# Patient Record
Sex: FEMALE
[De-identification: names, ages, dates, MRNs, and addresses within clinical notes are randomized per-mention and may not be internally consistent; named-entity substitution may affect disease eponyms.]

---

## 2023-06-15 ENCOUNTER — APPOINTMENT (OUTPATIENT)
Dept: VASCULAR SURGERY | Facility: CLINIC | Age: 78
End: 2023-06-15
Payer: MEDICARE

## 2023-06-15 VITALS
HEART RATE: 67 BPM | DIASTOLIC BLOOD PRESSURE: 63 MMHG | BODY MASS INDEX: 25.49 KG/M2 | WEIGHT: 135 LBS | HEIGHT: 61 IN | SYSTOLIC BLOOD PRESSURE: 101 MMHG

## 2023-06-15 DIAGNOSIS — I87.2 VENOUS INSUFFICIENCY (CHRONIC) (PERIPHERAL): ICD-10-CM

## 2023-06-15 PROCEDURE — 99204 OFFICE O/P NEW MOD 45 MIN: CPT

## 2023-06-15 PROCEDURE — 93970 EXTREMITY STUDY: CPT

## 2023-06-15 NOTE — HISTORY OF PRESENT ILLNESS
[FreeTextEntry1] : Patient is a 77-year-old female with history significant for hypertension and diabetes type 2 currently on metformin who presents to the office for evaluation of bilateral toe discoloration and bilateral spider and varicose veins.  Denies swelling or shortness of breath.\par Denies tissue loss, ulceration or bleeding varicosities.  Denies rest pain or claudication symptoms.\par No history of smoking.

## 2023-06-15 NOTE — ASSESSMENT
[FreeTextEntry1] :  77-year-old female with history significant for hypertension and diabetes type 2 currently on metformin who presents to the office for evaluation of bilateral toe discoloration and bilateral spider and varicose veins.\par \par Patient has no evidence of significant arterial insufficiency.\par \par In the office today, patient underwent venous duplex which demonstrated venous insufficiency of the greater saphenous vein lateral lower extremities.  Patient may be a candidate for ablation of the greater saphenous vein should conservative measures fail.\par \par Recommend compression and elevation.\par Patient to return for follow-up in 3 months.

## 2023-06-15 NOTE — PHYSICAL EXAM
[2+] : left 2+ [Varicose Veins Of Lower Extremities] : present [Ankle Swelling On The Left] : moderate [No Rash or Lesion] : No rash or lesion [Alert] : alert [Calm] : calm [JVD] : no jugular venous distention  [Ankle Swelling (On Exam)] : not present [] : not present

## 2023-12-19 ENCOUNTER — APPOINTMENT (OUTPATIENT)
Dept: VASCULAR SURGERY | Facility: CLINIC | Age: 78
End: 2023-12-19

## 2025-02-18 ENCOUNTER — TELEPHONE (OUTPATIENT)
Age: 80
End: 2025-02-18

## 2025-02-18 NOTE — TELEPHONE ENCOUNTER
Pt called to schedule a new patient appointment. Pt c/o heart burn.    Pt does speak fairly well english but an  was included in the call due to the amount of information needing to be obtained.    Pt scheduled for Tuesday, 2/25/25 at 1 pm with Dr. Ramírez.  Pt does have medicare insurance but did not have cards with her at the time of scheduling.    Pt will bring photo ID and insurance cards to appointment.

## 2025-02-25 ENCOUNTER — OFFICE VISIT (OUTPATIENT)
Dept: FAMILY MEDICINE CLINIC | Facility: CLINIC | Age: 80
End: 2025-02-25

## 2025-02-25 VITALS
DIASTOLIC BLOOD PRESSURE: 62 MMHG | TEMPERATURE: 96.2 F | OXYGEN SATURATION: 97 % | SYSTOLIC BLOOD PRESSURE: 130 MMHG | HEART RATE: 61 BPM | BODY MASS INDEX: 29.45 KG/M2 | WEIGHT: 150 LBS | HEIGHT: 60 IN

## 2025-02-25 DIAGNOSIS — D86.0 SARCOIDOSIS OF LUNG (HCC): ICD-10-CM

## 2025-02-25 DIAGNOSIS — I10 PRIMARY HYPERTENSION: ICD-10-CM

## 2025-02-25 DIAGNOSIS — R73.9 HYPERGLYCEMIA: ICD-10-CM

## 2025-02-25 DIAGNOSIS — E03.9 HYPOTHYROIDISM, UNSPECIFIED TYPE: ICD-10-CM

## 2025-02-25 DIAGNOSIS — Z76.89 ENCOUNTER TO ESTABLISH CARE: Primary | ICD-10-CM

## 2025-02-25 DIAGNOSIS — Z13.820 OSTEOPOROSIS SCREENING: ICD-10-CM

## 2025-02-25 DIAGNOSIS — N95.9 UNSPECIFIED MENOPAUSAL AND PERIMENOPAUSAL DISORDER: ICD-10-CM

## 2025-02-25 DIAGNOSIS — Z11.59 NEED FOR HEPATITIS C SCREENING TEST: ICD-10-CM

## 2025-02-25 PROBLEM — D57.3 SICKLE CELL TRAIT (HCC): Chronic | Status: ACTIVE | Noted: 2018-10-19

## 2025-02-25 PROBLEM — I35.1 AORTIC REGURGITATION: Status: ACTIVE | Noted: 2023-06-16

## 2025-02-25 PROBLEM — D50.9 IRON DEFICIENCY ANEMIA: Status: ACTIVE | Noted: 2018-04-20

## 2025-02-25 PROBLEM — G47.33 OBSTRUCTIVE SLEEP APNEA: Status: ACTIVE | Noted: 2025-02-25

## 2025-02-25 PROBLEM — Z90.710 S/P TAH (TOTAL ABDOMINAL HYSTERECTOMY): Status: ACTIVE | Noted: 2025-02-25

## 2025-02-25 PROBLEM — H40.9 GLAUCOMA: Status: ACTIVE | Noted: 2025-02-25

## 2025-02-25 RX ORDER — LOSARTAN POTASSIUM AND HYDROCHLOROTHIAZIDE 12.5; 1 MG/1; MG/1
1 TABLET ORAL DAILY
COMMUNITY
Start: 2025-01-01 | End: 2025-02-25 | Stop reason: ALTCHOICE

## 2025-02-25 RX ORDER — LOSARTAN POTASSIUM AND HYDROCHLOROTHIAZIDE 12.5; 1 MG/1; MG/1
1 TABLET ORAL DAILY
COMMUNITY

## 2025-02-25 RX ORDER — LEVOTHYROXINE SODIUM 75 UG/1
1 TABLET ORAL DAILY
COMMUNITY
Start: 2024-11-26

## 2025-02-25 RX ORDER — LISINOPRIL 5 MG/1
5 TABLET ORAL DAILY
Qty: 30 TABLET | Refills: 1 | Status: SHIPPED | OUTPATIENT
Start: 2025-02-25 | End: 2025-02-25 | Stop reason: CLARIF

## 2025-02-25 RX ORDER — BRINZOLAMIDE 10 MG/ML
1 SUSPENSION/ DROPS OPHTHALMIC
COMMUNITY
Start: 2025-01-09

## 2025-02-25 RX ORDER — LISINOPRIL 20 MG/1
20 TABLET ORAL DAILY
COMMUNITY
End: 2025-02-25 | Stop reason: ALTCHOICE

## 2025-02-25 NOTE — ASSESSMENT & PLAN NOTE
-Maintained on 75 mcg daily of levothyroxine  Orders:    Lipid panel; Future    TSH, 3rd generation; Future    T4, free; Future

## 2025-02-25 NOTE — ASSESSMENT & PLAN NOTE
-Maintained on losartan-HCTZ 100-12.5 mg daily  -BP stable today  Orders:    Comprehensive metabolic panel; Future    CBC and differential; Future

## 2025-02-25 NOTE — ASSESSMENT & PLAN NOTE
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

## 2025-02-25 NOTE — PROGRESS NOTES
Name: Radha Rios      : 1945      MRN: [id number]  Encounter Provider: Liv Ramírez MD  Encounter Date: 2025   Encounter department: [facility]    Assessment & Plan  Encounter to establish care  -Patient has history of sarcoidosis, HTN, ataxia post stroke back in   -Patient has a lot of history, we will await records  -Will obtain baseline labs for now  -Patient may benefit from statin therapy given history of stroke  -Follow-up in 2 weeks for annual physical       Osteoporosis screening    Orders:    DXA bone density spine hip and pelvis; Future    Primary hypertension  -Maintained on losartan-HCTZ 100-12.5 mg daily  -BP stable today  Orders:    Comprehensive metabolic panel; Future    CBC and differential; Future    Hypothyroidism, unspecified type  -Maintained on 75 mcg daily of levothyroxine  Orders:    Lipid panel; Future    TSH, 3rd generation; Future    T4, free; Future    Unspecified menopausal and perimenopausal disorder    Orders:    DXA bone density spine hip and pelvis; Future    Hyperglycemia  -Patient has no history of type 2 diabetes  -Latest A1c noted to be 6.8, unsure if patient was on steroid  -We will obtain HbA1c  Orders:    Hemoglobin A1C; Future    Sarcoidosis of lung (HCC)  -Reports history of sarcoidosis of lung several years ago  -Reports that she has never followed pulmonologist   -She states that she received steroid treatment several years ago by her PCP  -We will refer to pulmonology  Orders:    Ambulatory Referral to Pulmonology; Future    Need for hepatitis C screening test    Orders:    Hepatitis C antibody; Future         History of Present Illness     Patient is a 79-year-old female who presents to the office today to establish care.  Patient states that she was following Dr. Neil who was her primary care until now.  She states that this office is near and therefore would like to establish care.  Patient denies any acute concerns  today.  She states that she only takes 2 medications 1 for hypothyroidism and 1 for blood pressure.  Patient states that she had a history of stroke in the past, unsure the year.  She states that she has history of sarcoidosis although has never seen a pulmonologist.  Patient states that she does not follow any other specialists.  Patient states that she had cholecystectomy, hysterectomy in the past.  She states that she lives alone and her son lives in [place].  She states that she is independent with her ADLs and IADLs.  No other concerns today.      Review of Systems   Constitutional:  Negative for chills and fever.   HENT:  Negative for ear pain and sore throat.    Eyes:  Negative for pain and visual disturbance.   Respiratory:  Negative for cough and shortness of breath.    Cardiovascular:  Negative for chest pain and palpitations.   Gastrointestinal:  Negative for abdominal pain and vomiting.   Genitourinary:  Negative for dysuria and hematuria.   Musculoskeletal:  Negative for arthralgias and back pain.   Skin:  Negative for color change and rash.   Neurological:  Negative for seizures and syncope.   All other systems reviewed and are negative.    Objective     /62   Pulse 61   Temp (!) 96.2 °F (35.7 °C)   Ht 5' (1.524 m)   Wt 68 kg (150 lb)   SpO2 97%   BMI 29.29 kg/m²     Physical Exam  Vitals and nursing note reviewed.   Constitutional:       General: She is not in acute distress.     Appearance: She is well-developed.   HENT:      Head: Normocephalic and atraumatic.   Eyes:      Conjunctiva/sclera: Conjunctivae normal.   Cardiovascular:      Rate and Rhythm: Normal rate and regular rhythm.      Heart sounds: No murmur heard.  Pulmonary:      Effort: Pulmonary effort is normal. No respiratory distress.      Breath sounds: Normal breath sounds.   Abdominal:      Palpations: Abdomen is soft.      Tenderness: There is no abdominal tenderness.   Musculoskeletal:         General: No swelling.       Cervical back: Neck supple.   Skin:     General: Skin is warm and dry.      Capillary Refill: Capillary refill takes less than 2 seconds.   Neurological:      Mental Status: She is alert.   Psychiatric:         Mood and Affect: Mood normal.         Liv Ramírez MD

## 2025-02-26 ENCOUNTER — APPOINTMENT (OUTPATIENT)
Dept: LAB | Facility: CLINIC | Age: 80
End: 2025-02-26
Payer: MEDICARE

## 2025-02-26 DIAGNOSIS — E03.9 HYPOTHYROIDISM, UNSPECIFIED TYPE: ICD-10-CM

## 2025-02-26 DIAGNOSIS — Z11.59 NEED FOR HEPATITIS C SCREENING TEST: ICD-10-CM

## 2025-02-26 DIAGNOSIS — R73.9 HYPERGLYCEMIA: ICD-10-CM

## 2025-02-26 DIAGNOSIS — I10 PRIMARY HYPERTENSION: ICD-10-CM

## 2025-02-26 LAB
ALBUMIN SERPL BCG-MCNC: 4 G/DL (ref 3.5–5)
ALP SERPL-CCNC: 95 U/L (ref 34–104)
ALT SERPL W P-5'-P-CCNC: 51 U/L (ref 7–52)
ANION GAP SERPL CALCULATED.3IONS-SCNC: 10 MMOL/L (ref 4–13)
AST SERPL W P-5'-P-CCNC: 36 U/L (ref 13–39)
BASOPHILS # BLD AUTO: 0.06 THOUSANDS/ÂΜL (ref 0–0.1)
BASOPHILS NFR BLD AUTO: 1 % (ref 0–1)
BILIRUB SERPL-MCNC: 0.57 MG/DL (ref 0.2–1)
BUN SERPL-MCNC: 17 MG/DL (ref 5–25)
CALCIUM SERPL-MCNC: 9.5 MG/DL (ref 8.4–10.2)
CHLORIDE SERPL-SCNC: 107 MMOL/L (ref 96–108)
CHOLEST SERPL-MCNC: 178 MG/DL (ref ?–200)
CO2 SERPL-SCNC: 27 MMOL/L (ref 21–32)
CREAT SERPL-MCNC: 0.95 MG/DL (ref 0.6–1.3)
EOSINOPHIL # BLD AUTO: 0.14 THOUSAND/ÂΜL (ref 0–0.61)
EOSINOPHIL NFR BLD AUTO: 2 % (ref 0–6)
ERYTHROCYTE [DISTWIDTH] IN BLOOD BY AUTOMATED COUNT: 14.1 % (ref 11.6–15.1)
GFR SERPL CREATININE-BSD FRML MDRD: 57 ML/MIN/1.73SQ M
GLUCOSE P FAST SERPL-MCNC: 111 MG/DL (ref 65–99)
HCT VFR BLD AUTO: 45.5 % (ref 34.8–46.1)
HCV AB SER QL: NORMAL
HDLC SERPL-MCNC: 60 MG/DL
HGB BLD-MCNC: 14.5 G/DL (ref 11.5–15.4)
IMM GRANULOCYTES # BLD AUTO: 0.05 THOUSAND/UL (ref 0–0.2)
IMM GRANULOCYTES NFR BLD AUTO: 1 % (ref 0–2)
LDLC SERPL CALC-MCNC: 99 MG/DL (ref 0–100)
LYMPHOCYTES # BLD AUTO: 3.07 THOUSANDS/ÂΜL (ref 0.6–4.47)
LYMPHOCYTES NFR BLD AUTO: 48 % (ref 14–44)
MCH RBC QN AUTO: 29.1 PG (ref 26.8–34.3)
MCHC RBC AUTO-ENTMCNC: 31.9 G/DL (ref 31.4–37.4)
MCV RBC AUTO: 91 FL (ref 82–98)
MONOCYTES # BLD AUTO: 0.55 THOUSAND/ÂΜL (ref 0.17–1.22)
MONOCYTES NFR BLD AUTO: 9 % (ref 4–12)
NEUTROPHILS # BLD AUTO: 2.44 THOUSANDS/ÂΜL (ref 1.85–7.62)
NEUTS SEG NFR BLD AUTO: 39 % (ref 43–75)
NONHDLC SERPL-MCNC: 118 MG/DL
NRBC BLD AUTO-RTO: 0 /100 WBCS
PLATELET # BLD AUTO: 209 THOUSANDS/UL (ref 149–390)
PMV BLD AUTO: 10.7 FL (ref 8.9–12.7)
POTASSIUM SERPL-SCNC: 3.6 MMOL/L (ref 3.5–5.3)
PROT SERPL-MCNC: 7.2 G/DL (ref 6.4–8.4)
RBC # BLD AUTO: 4.98 MILLION/UL (ref 3.81–5.12)
SODIUM SERPL-SCNC: 144 MMOL/L (ref 135–147)
T4 FREE SERPL-MCNC: 0.9 NG/DL (ref 0.61–1.12)
TRIGL SERPL-MCNC: 96 MG/DL (ref ?–150)
TSH SERPL DL<=0.05 MIU/L-ACNC: 2.39 UIU/ML (ref 0.45–4.5)
WBC # BLD AUTO: 6.31 THOUSAND/UL (ref 4.31–10.16)

## 2025-02-26 PROCEDURE — 80053 COMPREHEN METABOLIC PANEL: CPT

## 2025-02-26 PROCEDURE — 83036 HEMOGLOBIN GLYCOSYLATED A1C: CPT

## 2025-02-26 PROCEDURE — 36415 COLL VENOUS BLD VENIPUNCTURE: CPT

## 2025-02-26 PROCEDURE — 85025 COMPLETE CBC W/AUTO DIFF WBC: CPT

## 2025-02-26 PROCEDURE — 86803 HEPATITIS C AB TEST: CPT

## 2025-02-26 PROCEDURE — 80061 LIPID PANEL: CPT

## 2025-02-26 PROCEDURE — 84443 ASSAY THYROID STIM HORMONE: CPT

## 2025-02-26 PROCEDURE — 84439 ASSAY OF FREE THYROXINE: CPT

## 2025-02-27 ENCOUNTER — RESULTS FOLLOW-UP (OUTPATIENT)
Dept: FAMILY MEDICINE CLINIC | Facility: CLINIC | Age: 80
End: 2025-02-27

## 2025-02-27 PROBLEM — E11.9 TYPE 2 DIABETES MELLITUS WITHOUT COMPLICATION, WITHOUT LONG-TERM CURRENT USE OF INSULIN (HCC): Status: ACTIVE | Noted: 2025-02-27

## 2025-02-27 PROBLEM — N18.31 STAGE 3A CHRONIC KIDNEY DISEASE (HCC): Status: ACTIVE | Noted: 2025-02-27

## 2025-02-27 LAB
EST. AVERAGE GLUCOSE BLD GHB EST-MCNC: 148 MG/DL
HBA1C MFR BLD: 6.8 %

## 2025-02-27 NOTE — TELEPHONE ENCOUNTER
Patient informed of test results. Patient wanted to be seen sooner than March 11th due to leaving the country soon. Rescheduled to sooner appointment with Andrew.     ----- Message from Liv Ramírez MD sent at 2/27/2025 11:42 AM EST -----  [facility],    Please inform patient that I have reviewed labs. Patient's electrolytes, liver function, thyroid function, hemoglobin levels, cholesterol levels are all within normal limits. Hepatitis C is negative. HbA1c which is a 3 month average of blood sugars shows Type 2 diabetes. We will discuss management  and other labs in detail in the upcoming appointment.    Thanks,  Dr. Ramírez

## 2025-03-03 ENCOUNTER — RA CDI HCC (OUTPATIENT)
Dept: OTHER | Facility: HOSPITAL | Age: 80
End: 2025-03-03

## 2025-03-07 ENCOUNTER — OFFICE VISIT (OUTPATIENT)
Dept: FAMILY MEDICINE CLINIC | Facility: CLINIC | Age: 80
End: 2025-03-07
Payer: MEDICARE

## 2025-03-07 ENCOUNTER — TELEPHONE (OUTPATIENT)
Age: 80
End: 2025-03-07

## 2025-03-07 VITALS
TEMPERATURE: 98.4 F | OXYGEN SATURATION: 98 % | DIASTOLIC BLOOD PRESSURE: 60 MMHG | SYSTOLIC BLOOD PRESSURE: 128 MMHG | WEIGHT: 149.2 LBS | BODY MASS INDEX: 29.29 KG/M2 | HEIGHT: 60 IN | HEART RATE: 85 BPM

## 2025-03-07 DIAGNOSIS — K21.9 GASTROESOPHAGEAL REFLUX DISEASE, UNSPECIFIED WHETHER ESOPHAGITIS PRESENT: Primary | ICD-10-CM

## 2025-03-07 DIAGNOSIS — N18.31 STAGE 3A CHRONIC KIDNEY DISEASE (HCC): ICD-10-CM

## 2025-03-07 DIAGNOSIS — G89.29 CHRONIC RIGHT HIP PAIN: ICD-10-CM

## 2025-03-07 DIAGNOSIS — E11.9 TYPE 2 DIABETES MELLITUS WITHOUT COMPLICATION, WITHOUT LONG-TERM CURRENT USE OF INSULIN (HCC): ICD-10-CM

## 2025-03-07 DIAGNOSIS — M25.551 CHRONIC RIGHT HIP PAIN: ICD-10-CM

## 2025-03-07 PROBLEM — R13.19 ESOPHAGEAL DYSPHAGIA: Status: RESOLVED | Noted: 2021-10-28 | Resolved: 2025-03-07

## 2025-03-07 PROBLEM — D64.9 SYMPTOMATIC ANEMIA: Status: RESOLVED | Noted: 2018-04-03 | Resolved: 2025-03-07

## 2025-03-07 PROBLEM — K44.9 PARAESOPHAGEAL HERNIA: Status: RESOLVED | Noted: 2022-08-11 | Resolved: 2025-03-07

## 2025-03-07 PROBLEM — Z87.19 HX OF DIVERTICULITIS OF COLON: Status: RESOLVED | Noted: 2025-03-07 | Resolved: 2025-03-07

## 2025-03-07 LAB
CREAT UR-MCNC: 24.9 MG/DL
MICROALBUMIN UR-MCNC: 74.1 MG/L
MICROALBUMIN/CREAT 24H UR: 298 MG/G CREATININE (ref 0–30)

## 2025-03-07 PROCEDURE — G0438 PPPS, INITIAL VISIT: HCPCS | Performed by: PHYSICIAN ASSISTANT

## 2025-03-07 PROCEDURE — 82570 ASSAY OF URINE CREATININE: CPT | Performed by: PHYSICIAN ASSISTANT

## 2025-03-07 PROCEDURE — 82043 UR ALBUMIN QUANTITATIVE: CPT | Performed by: PHYSICIAN ASSISTANT

## 2025-03-07 PROCEDURE — 99214 OFFICE O/P EST MOD 30 MIN: CPT | Performed by: PHYSICIAN ASSISTANT

## 2025-03-07 PROCEDURE — G2211 COMPLEX E/M VISIT ADD ON: HCPCS | Performed by: PHYSICIAN ASSISTANT

## 2025-03-07 RX ORDER — FAMOTIDINE 20 MG/1
20 TABLET, FILM COATED ORAL 2 TIMES DAILY
Qty: 120 TABLET | Refills: 0 | Status: SHIPPED | OUTPATIENT
Start: 2025-03-07

## 2025-03-07 NOTE — ASSESSMENT & PLAN NOTE
- med refill   Orders:    famotidine (PEPCID) 20 mg tablet; Take 1 tablet (20 mg total) by mouth 2 (two) times a day

## 2025-03-07 NOTE — ASSESSMENT & PLAN NOTE
Lab Results   Component Value Date    EGFR 57 02/26/2025    EGFR 68 10/23/2024    EGFR 76 03/25/2024    CREATININE 0.95 02/26/2025    CREATININE 0.9 10/23/2024    CREATININE 0.8 03/25/2024      - EGFR stable

## 2025-03-07 NOTE — PATIENT INSTRUCTIONS
Medicare Preventive Visit Patient Instructions  Thank you for completing your Welcome to Medicare Visit or Medicare Annual Wellness Visit today. Your next wellness visit will be due in one year (3/8/2026).  The screening/preventive services that you may require over the next 5-10 years are detailed below. Some tests may not apply to you based off risk factors and/or age. Screening tests ordered at today's visit but not completed yet may show as past due. Also, please note that scanned in results may not display below.  Preventive Screenings:  Service Recommendations Previous Testing/Comments   Colorectal Cancer Screening  * Colonoscopy    * Fecal Occult Blood Test (FOBT)/Fecal Immunochemical Test (FIT)  * Fecal DNA/Cologuard Test  * Flexible Sigmoidoscopy Age: 45-75 years old   Colonoscopy: every 10 years (may be performed more frequently if at higher risk)  OR  FOBT/FIT: every 1 year  OR  Cologuard: every 3 years  OR  Sigmoidoscopy: every 5 years  Screening may be recommended earlier than age 45 if at higher risk for colorectal cancer. Also, an individualized decision between you and your healthcare provider will decide whether screening between the ages of 76-85 would be appropriate. Colonoscopy: Not on file  FOBT/FIT: Not on file  Cologuard: Not on file  Sigmoidoscopy: Not on file    Screening Not Indicated     Breast Cancer Screening Age: 40+ years old  Frequency: every 1-2 years  Not required if history of left and right mastectomy Mammogram: 06/13/2024    Screening Current   Cervical Cancer Screening Between the ages of 21-29, pap smear recommended once every 3 years.   Between the ages of 30-65, can perform pap smear with HPV co-testing every 5 years.   Recommendations may differ for women with a history of total hysterectomy, cervical cancer, or abnormal pap smears in past. Pap Smear: Not on file    Screening Not Indicated   Hepatitis C Screening Once for adults born between 1945 and 1965  More frequently in  patients at high risk for Hepatitis C Hep C Antibody: 02/26/2025    Screening Current   Diabetes Screening 1-2 times per year if you're at risk for diabetes or have pre-diabetes Fasting glucose: 111 mg/dL (2/26/2025)  A1C: 6.8 % (2/26/2025)  Screening Not Indicated  History Diabetes   Cholesterol Screening Once every 5 years if you don't have a lipid disorder. May order more often based on risk factors. Lipid panel: 02/26/2025    Screening Current     Other Preventive Screenings Covered by Medicare:  Abdominal Aortic Aneurysm (AAA) Screening: covered once if your at risk. You're considered to be at risk if you have a family history of AAA.  Lung Cancer Screening: covers low dose CT scan once per year if you meet all of the following conditions: (1) Age 55-77; (2) No signs or symptoms of lung cancer; (3) Current smoker or have quit smoking within the last 15 years; (4) You have a tobacco smoking history of at least 20 pack years (packs per day multiplied by number of years you smoked); (5) You get a written order from a healthcare provider.  Glaucoma Screening: covered annually if you're considered high risk: (1) You have diabetes OR (2) Family history of glaucoma OR (3)  aged 50 and older OR (4)  American aged 65 and older  Osteoporosis Screening: covered every 2 years if you meet one of the following conditions: (1) You're estrogen deficient and at risk for osteoporosis based off medical history and other findings; (2) Have a vertebral abnormality; (3) On glucocorticoid therapy for more than 3 months; (4) Have primary hyperparathyroidism; (5) On osteoporosis medications and need to assess response to drug therapy.   Last bone density test (DXA Scan): Not on file.  HIV Screening: covered annually if you're between the age of 15-65. Also covered annually if you are younger than 15 and older than 65 with risk factors for HIV infection. For pregnant patients, it is covered up to 3 times per  pregnancy.    Immunizations:  Immunization Recommendations   Influenza Vaccine Annual influenza vaccination during flu season is recommended for all persons aged >= 6 months who do not have contraindications   Pneumococcal Vaccine   * Pneumococcal conjugate vaccine = PCV13 (Prevnar 13), PCV15 (Vaxneuvance), PCV20 (Prevnar 20)  * Pneumococcal polysaccharide vaccine = PPSV23 (Pneumovax) Adults 19-63 yo with certain risk factors or if 65+ yo  If never received any pneumonia vaccine: recommend Prevnar 20 (PCV20)  Give PCV20 if previously received 1 dose of PCV13 or PPSV23   Hepatitis B Vaccine 3 dose series if at intermediate or high risk (ex: diabetes, end stage renal disease, liver disease)   Respiratory syncytial virus (RSV) Vaccine - COVERED BY MEDICARE PART D  * RSVPreF3 (Arexvy) CDC recommends that adults 60 years of age and older may receive a single dose of RSV vaccine using shared clinical decision-making (SCDM)   Tetanus (Td) Vaccine - COST NOT COVERED BY MEDICARE PART B Following completion of primary series, a booster dose should be given every 10 years to maintain immunity against tetanus. Td may also be given as tetanus wound prophylaxis.   Tdap Vaccine - COST NOT COVERED BY MEDICARE PART B Recommended at least once for all adults. For pregnant patients, recommended with each pregnancy.   Shingles Vaccine (Shingrix) - COST NOT COVERED BY MEDICARE PART B  2 shot series recommended in those 19 years and older who have or will have weakened immune systems or those 50 years and older     Health Maintenance Due:      Topic Date Due   • Hepatitis C Screening  Completed     Immunizations Due:      Topic Date Due   • Pneumococcal Vaccine: 65+ Years (1 of 2 - PCV) Never done   • Influenza Vaccine (1) Never done   • COVID-19 Vaccine (3 - 2024-25 season) 09/01/2024     Advance Directives   What are advance directives?  Advance directives are legal documents that state your wishes and plans for medical care. These  plans are made ahead of time in case you lose your ability to make decisions for yourself. Advance directives can apply to any medical decision, such as the treatments you want, and if you want to donate organs.   What are the types of advance directives?  There are many types of advance directives, and each state has rules about how to use them. You may choose a combination of any of the following:  Living will:  This is a written record of the treatment you want. You can also choose which treatments you do not want, which to limit, and which to stop at a certain time. This includes surgery, medicine, IV fluid, and tube feedings.   Durable power of  for healthcare (DPAHC):  This is a written record that states who you want to make healthcare choices for you when you are unable to make them for yourself. This person, called a proxy, is usually a family member or a friend. You may choose more than 1 proxy.  Do not resuscitate (DNR) order:  A DNR order is used in case your heart stops beating or you stop breathing. It is a request not to have certain forms of treatment, such as CPR. A DNR order may be included in other types of advance directives.  Medical directive:  This covers the care that you want if you are in a coma, near death, or unable to make decisions for yourself. You can list the treatments you want for each condition. Treatment may include pain medicine, surgery, blood transfusions, dialysis, IV or tube feedings, and a ventilator (breathing machine).  Values history:  This document has questions about your views, beliefs, and how you feel and think about life. This information can help others choose the care that you would choose.  Why are advance directives important?  An advance directive helps you control your care. Although spoken wishes may be used, it is better to have your wishes written down. Spoken wishes can be misunderstood, or not followed. Treatments may be given even if you do not  want them. An advance directive may make it easier for your family to make difficult choices about your care.   Urinary Incontinence   Urinary incontinence (UI)  is when you lose control of your bladder. UI develops because your bladder cannot store or empty urine properly. The 3 most common types of UI are stress incontinence, urge incontinence, or both.  Medicines:   May be given to help strengthen your bladder control. Report any side effects of medication to your healthcare provider.  Do pelvic muscle exercises often:  Your pelvic muscles help you stop urinating. Squeeze these muscles tight for 5 seconds, then relax for 5 seconds. Gradually work up to squeezing for 10 seconds. Do 3 sets of 15 repetitions a day, or as directed. This will help strengthen your pelvic muscles and improve bladder control.  Train your bladder:  Go to the bathroom at set times, such as every 2 hours, even if you do not feel the urge to go. You can also try to hold your urine when you feel the urge to go. For example, hold your urine for 5 minutes when you feel the urge to go. As that becomes easier, hold your urine for 10 minutes.   Self-care:   Keep a UI record.  Write down how often you leak urine and how much you leak. Make a note of what you were doing when you leaked urine.  Drink liquids as directed. You may need to limit the amount of liquid you drink to help control your urine leakage. Do not drink any liquid right before you go to bed. Limit or do not have drinks that contain caffeine or alcohol.   Prevent constipation.  Eat a variety of high-fiber foods. Good examples are high-fiber cereals, beans, vegetables, and whole-grain breads. Walking is the best way to trigger your intestines to have a bowel movement.  Exercise regularly and maintain a healthy weight.  Weight loss and exercise will decrease pressure on your bladder and help you control your leakage.   Use a catheter as directed  to help empty your bladder. A catheter  is a tiny, plastic tube that is put into your bladder to drain your urine.   Go to behavior therapy as directed.  Behavior therapy may be used to help you learn to control your urge to urinate.    Weight Management   Why it is important to manage your weight:  Being overweight increases your risk of health conditions such as heart disease, high blood pressure, type 2 diabetes, and certain types of cancer. It can also increase your risk for osteoarthritis, sleep apnea, and other respiratory problems. Aim for a slow, steady weight loss. Even a small amount of weight loss can lower your risk of health problems.  How to lose weight safely:  A safe and healthy way to lose weight is to eat fewer calories and get regular exercise. You can lose up about 1 pound a week by decreasing the number of calories you eat by 500 calories each day.   Healthy meal plan for weight management:  A healthy meal plan includes a variety of foods, contains fewer calories, and helps you stay healthy. A healthy meal plan includes the following:  Eat whole-grain foods more often.  A healthy meal plan should contain fiber. Fiber is the part of grains, fruits, and vegetables that is not broken down by your body. Whole-grain foods are healthy and provide extra fiber in your diet. Some examples of whole-grain foods are whole-wheat breads and pastas, oatmeal, brown rice, and bulgur.  Eat a variety of vegetables every day.  Include dark, leafy greens such as spinach, kale, rajni greens, and mustard greens. Eat yellow and orange vegetables such as carrots, sweet potatoes, and winter squash.   Eat a variety of fruits every day.  Choose fresh or canned fruit (canned in its own juice or light syrup) instead of juice. Fruit juice has very little or no fiber.  Eat low-fat dairy foods.  Drink fat-free (skim) milk or 1% milk. Eat fat-free yogurt and low-fat cottage cheese. Try low-fat cheeses such as mozzarella and other reduced-fat cheeses.  Choose meat  and other protein foods that are low in fat.  Choose beans or other legumes such as split peas or lentils. Choose fish, skinless poultry (chicken or turkey), or lean cuts of red meat (beef or pork). Before you cook meat or poultry, cut off any visible fat.   Use less fat and oil.  Try baking foods instead of frying them. Add less fat, such as margarine, sour cream, regular salad dressing and mayonnaise to foods. Eat fewer high-fat foods. Some examples of high-fat foods include french fries, doughnuts, ice cream, and cakes.  Eat fewer sweets.  Limit foods and drinks that are high in sugar. This includes candy, cookies, regular soda, and sweetened drinks.  Exercise:  Exercise at least 30 minutes per day on most days of the week. Some examples of exercise include walking, biking, dancing, and swimming. You can also fit in more physical activity by taking the stairs instead of the elevator or parking farther away from stores. Ask your healthcare provider about the best exercise plan for you.      © Copyright [employer] 2018 Information is for End User's use only and may not be sold, redistributed or otherwise used for commercial purposes. All illustrations and images included in CareNotes® are the copyrighted property of [employer] or [employer]

## 2025-03-07 NOTE — ASSESSMENT & PLAN NOTE
Lab Results   Component Value Date    HGBA1C 6.8 (H) 02/26/2025      - Hx of T2DM, not currently on medication, manages with exercise and diet    - A1c 6.8 as of 2/2025, at goal   - DM foot exam WNL   - Will not opt for medication at this time, continue to manage w/ lifestyle and will monitor every 6 months     Orders:    Ambulatory Referral to Ophthalmology; Future    Albumin / creatinine urine ratio; Future

## 2025-03-07 NOTE — TELEPHONE ENCOUNTER
Son requests recent lab results and PCP recommendation. He asks for clarification about chronic renal disease, diabetes, and cholesterol.

## 2025-03-10 NOTE — TELEPHONE ENCOUNTER
Patient son calling in for the 3rd time requesting a call back regarding questions. Advised Dr. Leal is not in the office today. We will be getting back to her.    Pt son Phone # [phone].       Thank you

## 2025-03-11 ENCOUNTER — TELEPHONE (OUTPATIENT)
Dept: FAMILY MEDICINE CLINIC | Facility: CLINIC | Age: 80
End: 2025-03-11

## 2025-03-11 NOTE — TELEPHONE ENCOUNTER
"Using  # [id number] called patient to really the following message also sent via My Chart from the Provider.    Left message to contact the office. Please relay message.         Good Afternoon,     I tried to call to discuss lab reports. However, please note that a message was sent to patient on 2/27/25 specifically noting that all lab work was going to be discussed at next visit, which was today and already canceled. While I can understand your concern, please rest assured that anything urgent emergent would be discussed ASAP with patient/family. While labs are visible in the chart and appear red/\"abnormal\" this may not necessarily be an urgent/emergent finding.     Thanks,    " 17-Feb-2018

## 2025-03-11 NOTE — TELEPHONE ENCOUNTER
Hi Ladies,     I actually saw this patient on 3/7/2025 when I had the opportunity to review labs with patient, son was not present for visit. As patient is a previously diagnosed diabetic, A1c of 6.8 indicates good control of condition currently so I did not prescribe any diabetes medication at this time but it is something we will continue to monitor. Based on her GFR, patient does technically have chronic kidney disease but her GFR is stable when comparing to previous GFR's collected for this patient. This, combined with good control of both high blood pressure and diabetes, indicates that we are doing everything that we can to delay further progression of chronic kidney disease at this time. Lastly, upon review of her lipid panel, cholesterol numbers look great and we have no concerns from that standpoint.     If there are any other questions or concerns, please let me know !     Kash

## 2025-03-11 NOTE — TELEPHONE ENCOUNTER
PCP attempted to call patient son to discuss labs. Please note that a message was sent on 2/27/25 noting that all lab work was going to be reviewed with patient at today's visit. Unfortunately, appointment was canceled and lab work is something that needed to be discussed.

## 2025-03-11 NOTE — TELEPHONE ENCOUNTER
Called patient's son to relay message below from Kash. Left a general voicemail to call our office at [phone].

## 2025-03-12 ENCOUNTER — TELEPHONE (OUTPATIENT)
Dept: FAMILY MEDICINE CLINIC | Facility: CLINIC | Age: 80
End: 2025-03-12

## 2025-03-12 NOTE — TELEPHONE ENCOUNTER
Spoke to patient's son. Advised patient have an appt to go over everything.     Pt son is in [place] and he is not able to attend the appt. He would like a conference call at he time of the appt so he can hear what is being discussed.    Made a note in the appt notes to call him during the appt time. He will call his Mom to confirm the appt time is good for her as well. If it is not they will call to reschedule.    Note: I noticed the appt on 3-11-25 was rescheduled on 2-27-25 when the 3-7-25 appt was made. It was for an AWV not a follow up appt. There was no follow up made after labs. The next appt was made for May.

## 2025-03-12 NOTE — TELEPHONE ENCOUNTER
Son called to follow up about results. Informed him of results and recommendations, per provider notes.     Son states patient did not know she has kidney disease.     He has many questions about her lab results, her diagnoses, and the plan of care.     Son requests to speak directly by phone with a provider. He asks that someone give him advance notice of when the provider will call, and he will make sure to be available to receive the call.

## 2025-03-13 NOTE — ASSESSMENT & PLAN NOTE
Lab Results   Component Value Date    EGFR 57 02/26/2025    EGFR 68 10/23/2024    EGFR 76 03/25/2024    CREATININE 0.95 02/26/2025    CREATININE 0.9 10/23/2024    CREATININE 0.8 03/25/2024      - EGFR stable, BP and T2DM well controlled, will continue to monitor

## 2025-03-13 NOTE — PROGRESS NOTES
Name: Radha Rios      : 1945      MRN: [id number]  Encounter Provider: Kash Morales PA-C  Encounter Date: 3/7/2025   Encounter department: [facility] PRIMARY CARE    Assessment & Plan  Type 2 diabetes mellitus without complication, without long-term current use of insulin (HCC)       - Hx of T2DM, not currently on medication, manages with exercise and diet               - A1c 6.8 as of 2025, at goal              - DM foot exam WNL              - Will not opt for medication at this time, continue to manage w/ lifestyle and will monitor every 6 months   Lab Results   Component Value Date    HGBA1C 6.8 (H) 2025       Orders:    Ambulatory Referral to Ophthalmology; Future    Albumin / creatinine urine ratio; Future    Stage 3a chronic kidney disease (HCC)  Lab Results   Component Value Date    EGFR 57 2025    EGFR 68 10/23/2024    EGFR 76 2024    CREATININE 0.95 2025    CREATININE 0.9 10/23/2024    CREATININE 0.8 2024      - EGFR stable, BP and T2DM well controlled, will continue to monitor         Gastroesophageal reflux disease, unspecified whether esophagitis present     -refill  Orders:    famotidine (PEPCID) 20 mg tablet; Take 1 tablet (20 mg total) by mouth 2 (two) times a day    Chronic right hip pain     - Pt reported to PCP longstanding hx of R hip pain that comes and goes, worse with activity. NO hx of falls or trauma               - Exam significant for slightly decreased ROM, otherwise WNL                - Will collect baseline hip x ray and refer to PT and recheck in 2 months  Orders:    XR hip/pelv 2-3 vws right if performed; Future    Ambulatory Referral to Physical Therapy; Future      Depression Screening and Follow-up Plan: Patient was screened for depression during today's encounter. They screened negative with a PHQ-2 score of 1.      Urinary Incontinence Plan of Care: counseling topics discussed: issac Dawkins (pelvic floor strengthening)  exercises, use restroom every 2 hours and taking fluid pills at a time when you can get to bathroom easily.     ASCVD Risk Management:  The 10-year ASCVD risk score (Tong DK, et al., 2019) is: 43.9%    Patent does not have underlying ASCVD. Their ASCVD Risk is high (>= 20%).    Preventive services discussed: diabetes screening, diabetes self management training and diet & exercise.  Blood pressure management: well controlled.  Cholesterol management: well controlled.  Tobacco cessation: not smoker.    Preventive health issues were discussed with patient, and age appropriate screening tests were ordered as noted in patient's After Visit Summary. Personalized health advice and appropriate referrals for health education or preventive services given if needed, as noted in patient's After Visit Summary.    History of Present Illness     Radha Rios is a 79 y.o. female presenting for AWV.        Patient Care Team:  Damaris Valenzuela MD as PCP - General (Family Medicine)    Review of Systems   Constitutional:  Negative for fatigue and fever.   Respiratory:  Negative for cough and shortness of breath.    Cardiovascular:  Negative for chest pain.   Gastrointestinal:  Negative for diarrhea, nausea and vomiting.   Neurological:  Negative for headaches.     Medical History Reviewed by provider this encounter:  Tobacco  Allergies  Meds  Problems  Med Hx  Surg Hx  Fam Hx       Annual Wellness Visit Questionnaire   Radha is here for her Subsequent Wellness visit. Last Medicare Wellness visit information reviewed, patient interviewed and updates made to the record today.      Health Risk Assessment:   Patient rates overall health as fair. Patient feels that their physical health rating is slightly better. Patient is dissatisfied with their life. Eyesight was rated as slightly worse. Hearing was rated as slightly worse. Patient feels that their emotional and mental health rating is slightly worse. Patients states they  are sometimes angry. Patient states they are often unusually tired/fatigued. Pain experienced in the last 7 days has been none. Patient states that she has experienced no weight loss or gain in last 6 months.     Depression Screening:   PHQ-2 Score: 1      Fall Risk Screening:   In the past year, patient has experienced: no history of falling in past year      Urinary Incontinence Screening:   Patient has leaked urine accidently in the last six months.     Home Safety:  Patient has trouble with stairs inside or outside of their home. Patient has working smoke alarms and has working carbon monoxide detector. Home safety hazards include: none.     Nutrition:   Current diet is Regular.     Medications:   Patient is not currently taking any over-the-counter supplements. Patient is able to manage medications.     Activities of Daily Living (ADLs)/Instrumental Activities of Daily Living (IADLs):   Walk and transfer into and out of bed and chair?: Yes  Dress and groom yourself?: Yes    Bathe or shower yourself?: Yes    Feed yourself? Yes  Do your laundry/housekeeping?: No  Manage your money, pay your bills and track your expenses?: Yes  Make your own meals?: Yes    Do your own shopping?: Yes    Durable Medical Equipment Suppliers  N/A    Previous Hospitalizations:   Any hospitalizations or ED visits within the last 12 months?: No      Advance Care Planning:   Living will: No    Durable POA for healthcare: No    Advanced directive: No    Advanced directive counseling given: Yes    ACP document given: Yes      Cognitive Screening:   Provider or family/friend/caregiver concerned regarding cognition?: No    PREVENTIVE SCREENINGS      Cardiovascular Screening:    General: Screening Current      Diabetes Screening:     General: Screening Not Indicated and History Diabetes      Colorectal Cancer Screening:     General: Screening Not Indicated      Breast Cancer Screening:     General: Screening Current      Cervical Cancer  Screening:    General: Screening Not Indicated      Osteoporosis Screening:    General: Screening Not Indicated      Abdominal Aortic Aneurysm (AAA) Screening:        General: Screening Not Indicated      Lung Cancer Screening:     General: Screening Not Indicated      Hepatitis C Screening:    General: Screening Current    Screening, Brief Intervention, and Referral to Treatment (SBIRT)     Screening  Typical number of drinks in a day: 0  Typical number of drinks in a week: 0  Interpretation: Low risk drinking behavior.    AUDIT-C Screenin) How often did you have a drink containing alcohol in the past year? never  2) How many drinks did you have on a typical day when you were drinking in the past year? 0  3) How often did you have 6 or more drinks on one occasion in the past year? never    AUDIT-C Score: 0  Interpretation: Score 0-2 (female): Negative screen for alcohol misuse    Single Item Drug Screening:  How often have you used an illegal drug (including marijuana) or a prescription medication for non-medical reasons in the past year? never    Single Item Drug Screen Score: 0  Interpretation: Negative screen for possible drug use disorder    Other Counseling Topics:   Skin self-exam and calcium and vitamin D intake and regular weightbearing exercise.     Social Drivers of Health     Food Insecurity: No Food Insecurity (3/7/2025)    Hunger Vital Sign     Worried About Running Out of Food in the Last Year: Never true     Ran Out of Food in the Last Year: Never true   Transportation Needs: No Transportation Needs (3/7/2025)    PRAPARE - Transportation     Lack of Transportation (Medical): No     Lack of Transportation (Non-Medical): No   Housing Stability: Low Risk  (3/7/2025)    Housing Stability Vital Sign     Unable to Pay for Housing in the Last Year: No     Number of Times Moved in the Last Year: 0     Homeless in the Last Year: No   Utilities: Not At Risk (3/7/2025)    [facility] Utilities     Threatened with  loss of utilities: No     No results found.    Objective   /60 (BP Location: Right arm, Patient Position: Sitting, Cuff Size: Adult)   Pulse 85   Temp 98.4 °F (36.9 °C) (Tympanic)   Ht 5' (1.524 m)   Wt 67.7 kg (149 lb 3.2 oz)   SpO2 98%   BMI 29.14 kg/m²     Physical Exam  Constitutional:       Appearance: Normal appearance.   HENT:      Head: Normocephalic.      Right Ear: External ear normal.      Left Ear: External ear normal.      Nose: Nose normal.      Mouth/Throat:      Mouth: Mucous membranes are moist.      Pharynx: Oropharynx is clear.   Eyes:      Conjunctiva/sclera: Conjunctivae normal.   Cardiovascular:      Rate and Rhythm: Normal rate and regular rhythm.      Pulses: no weak pulses.           Dorsalis pedis pulses are 2+ on the right side and 2+ on the left side.        Posterior tibial pulses are 2+ on the right side and 2+ on the left side.      Heart sounds: Normal heart sounds.   Pulmonary:      Effort: Pulmonary effort is normal.      Breath sounds: Normal breath sounds.   Abdominal:      General: Bowel sounds are normal.      Palpations: Abdomen is soft.   Feet:      Right foot:      Skin integrity: No ulcer, skin breakdown, erythema, warmth, callus or dry skin.      Left foot:      Skin integrity: No ulcer, skin breakdown, erythema, warmth, callus or dry skin.   Neurological:      Mental Status: She is alert and oriented to person, place, and time.   Psychiatric:         Behavior: Behavior normal.       Diabetic Foot Exam    Patient's shoes and socks removed.    Right Foot/Ankle   Right Foot Inspection  Skin Exam: skin normal and skin intact. No dry skin, no warmth, no callus, no erythema, no maceration, no abnormal color, no pre-ulcer, no ulcer and no callus.     Toe Exam: ROM and strength within normal limits.     Sensory   Vibration: intact  Proprioception: intact  Monofilament testing: intact    Vascular  Capillary refills: < 3 seconds  The right DP pulse is 2+. The right PT  pulse is 2+.     Left Foot/Ankle  Left Foot Inspection  Skin Exam: skin normal and skin intact. No dry skin, no warmth, no erythema, no maceration, normal color, no pre-ulcer, no ulcer and no callus.     Toe Exam: ROM and strength within normal limits.     Sensory   Vibration: intact  Proprioception: intact  Monofilament testing: intact    Vascular  Capillary refills: < 3 seconds  The left DP pulse is 2+. The left PT pulse is 2+.     Assign Risk Category  No deformity present  No loss of protective sensation  No weak pulses  Risk: 0

## 2025-03-13 NOTE — ASSESSMENT & PLAN NOTE
-refill  Orders:    famotidine (PEPCID) 20 mg tablet; Take 1 tablet (20 mg total) by mouth 2 (two) times a day

## 2025-03-13 NOTE — ASSESSMENT & PLAN NOTE
- Hx of T2DM, not currently on medication, manages with exercise and diet               - A1c 6.8 as of 2/2025, at goal              - DM foot exam WNL              - Will not opt for medication at this time, continue to manage w/ lifestyle and will monitor every 6 months   Lab Results   Component Value Date    HGBA1C 6.8 (H) 02/26/2025       Orders:    Ambulatory Referral to Ophthalmology; Future    Albumin / creatinine urine ratio; Future

## 2025-03-17 ENCOUNTER — TELEPHONE (OUTPATIENT)
Dept: OTHER | Facility: OTHER | Age: 80
End: 2025-03-17

## 2025-03-17 NOTE — TELEPHONE ENCOUNTER
"Patient stated, \"I'm calling to follow up on a missed call because I believe I already spoke with Dr. Leal about my results.\"    Patient declined language line. Please follow up with patient.  "

## 2025-03-19 NOTE — TELEPHONE ENCOUNTER
Called patient at [phone] and number was not in service. I called alternative number to see if patient was available to speak. The son answered and tried to conference the call to see if he could reach her. He stated that it was not in service either. Patient has an appointment tomorrow and will discuss results then.

## 2025-03-20 ENCOUNTER — OFFICE VISIT (OUTPATIENT)
Dept: FAMILY MEDICINE CLINIC | Facility: CLINIC | Age: 80
End: 2025-03-20
Payer: MEDICARE

## 2025-03-20 VITALS
SYSTOLIC BLOOD PRESSURE: 118 MMHG | TEMPERATURE: 96.7 F | WEIGHT: 149 LBS | BODY MASS INDEX: 29.1 KG/M2 | DIASTOLIC BLOOD PRESSURE: 72 MMHG | OXYGEN SATURATION: 98 % | HEART RATE: 70 BPM

## 2025-03-20 DIAGNOSIS — I69.393 ATAXIA, POST-STROKE: ICD-10-CM

## 2025-03-20 DIAGNOSIS — I10 PRIMARY HYPERTENSION: ICD-10-CM

## 2025-03-20 DIAGNOSIS — N18.31 STAGE 3A CHRONIC KIDNEY DISEASE (HCC): Primary | ICD-10-CM

## 2025-03-20 DIAGNOSIS — E11.9 TYPE 2 DIABETES MELLITUS WITHOUT COMPLICATION, WITHOUT LONG-TERM CURRENT USE OF INSULIN (HCC): ICD-10-CM

## 2025-03-20 PROCEDURE — G2211 COMPLEX E/M VISIT ADD ON: HCPCS | Performed by: FAMILY MEDICINE

## 2025-03-20 PROCEDURE — 99214 OFFICE O/P EST MOD 30 MIN: CPT | Performed by: FAMILY MEDICINE

## 2025-03-20 RX ORDER — ATORVASTATIN CALCIUM 10 MG/1
10 TABLET, FILM COATED ORAL DAILY
Qty: 30 TABLET | Refills: 2 | Status: CANCELLED | OUTPATIENT
Start: 2025-03-20

## 2025-03-20 RX ORDER — METFORMIN HYDROCHLORIDE 500 MG/1
500 TABLET, EXTENDED RELEASE ORAL 2 TIMES DAILY WITH MEALS
Qty: 30 TABLET | Refills: 2 | Status: CANCELLED | OUTPATIENT
Start: 2025-03-20

## 2025-03-20 NOTE — ASSESSMENT & PLAN NOTE
-Per record, it appears that patient had a stroke with ataxia poststroke in 2014  -Patient states that she is unsure if there was really a stroke  -Denies seeing a neurologist  -We will refer to neurology to determine if patient needs to be on statin therapy at this time  Orders:    Ambulatory Referral to Neurology; Future

## 2025-03-20 NOTE — PROGRESS NOTES
Name: Radha Rios      : 1945      MRN: [id number]  Encounter Provider: Liv Ramírez MD  Encounter Date: 3/20/2025   Encounter department: [facility] PRIMARY CARE    Assessment & Plan  Stage 3a chronic kidney disease (HCC)  Lab Results   Component Value Date    EGFR 57 2025    EGFR 68 10/23/2024    EGFR 76 2024    CREATININE 0.95 2025    CREATININE 0.9 10/23/2024    CREATININE 0.8 2024   -GFR of 57, stable compared to her labs from last 2 years  -Stable given age  -Reassured patient and family that CKD stage IIIa at patient's age does not necessarily indicate worsening kidney function  -Avoid nephrotoxic drugs, information provided  -Patient's son would like to get second opinion from nephrology  -Referral to nephrology placed  Orders:    Ambulatory Referral to Nephrology; Future    Primary hypertension  -BP stable  -Continue current Rx       Type 2 diabetes mellitus without complication, without long-term current use of insulin (HCC)    Lab Results   Component Value Date    HGBA1C 6.8 (H) 2025     -HbA1c 6.8  -Son requests treatment with metformin  -Reiterated that the goal HbA1c is under 7.5, and patient at goal  -Medication not indicated at this time given that the HbA1c is at goal  -Educated on carb controlled diet  -Follow-up in 3 months       Ataxia, post-stroke  -Per record, it appears that patient had a stroke with ataxia poststroke in   -Patient states that she is unsure if there was really a stroke  -Denies seeing a neurologist  -We will refer to neurology to determine if patient needs to be on statin therapy at this time  Orders:    Ambulatory Referral to Neurology; Future         History of Present Illness     Patient is a 80 yo F who presents to the office to review labs. Patient's son was on the speaker during this encounter.  He reports concerns regarding CKD stage IIIa.  He also reports concerns regarding DM management.  Patient reports  on and off numbness in her little toe of her right foot.  Denies any acute concerns today.  Patient's son reports that he is unsure if she really had a stroke back in 2014.  He states that it was a very mild stroke although he is unsure of the real diagnosis of the incident.  Patient states that she has never seen a neurologist for that incident.  No acute concerns today.      Review of Systems   Constitutional:  Negative for chills and fever.   HENT:  Negative for ear pain and sore throat.    Eyes:  Negative for pain and visual disturbance.   Respiratory:  Negative for cough and shortness of breath.    Cardiovascular:  Negative for chest pain and palpitations.   Gastrointestinal:  Negative for abdominal pain and vomiting.   Genitourinary:  Negative for dysuria and hematuria.   Musculoskeletal:  Negative for arthralgias and back pain.   Skin:  Negative for color change and rash.   Neurological:  Positive for numbness (On and off numbness of her right fifth toe). Negative for seizures and syncope.   All other systems reviewed and are negative.    Objective     /72   Pulse 70   Temp (!) 96.7 °F (35.9 °C)   Wt 67.6 kg (149 lb)   SpO2 98%   BMI 29.10 kg/m²     Physical Exam  Vitals and nursing note reviewed.   Constitutional:       General: She is not in acute distress.     Appearance: She is well-developed.   HENT:      Head: Normocephalic and atraumatic.   Eyes:      Conjunctiva/sclera: Conjunctivae normal.   Cardiovascular:      Rate and Rhythm: Normal rate and regular rhythm.      Heart sounds: No murmur heard.  Pulmonary:      Effort: Pulmonary effort is normal. No respiratory distress.      Breath sounds: Normal breath sounds.   Abdominal:      Palpations: Abdomen is soft.      Tenderness: There is no abdominal tenderness.   Musculoskeletal:         General: No swelling.      Cervical back: Neck supple.   Skin:     General: Skin is warm and dry.      Capillary Refill: Capillary refill takes less than 2  seconds.   Neurological:      Mental Status: She is alert.   Psychiatric:         Mood and Affect: Mood normal.         Liv Ramírez MD

## 2025-03-20 NOTE — ASSESSMENT & PLAN NOTE
Lab Results   Component Value Date    EGFR 57 02/26/2025    EGFR 68 10/23/2024    EGFR 76 03/25/2024    CREATININE 0.95 02/26/2025    CREATININE 0.9 10/23/2024    CREATININE 0.8 03/25/2024   -GFR of 57, stable compared to her labs from last 2 years  -Stable given age  -Reassured patient and family that CKD stage IIIa at patient's age does not necessarily indicate worsening kidney function  -Avoid nephrotoxic drugs, information provided  -Patient's son would like to get second opinion from nephrology  -Referral to nephrology placed  Orders:    Ambulatory Referral to Nephrology; Future

## 2025-03-20 NOTE — ASSESSMENT & PLAN NOTE
Lab Results   Component Value Date    HGBA1C 6.8 (H) 02/26/2025     -HbA1c 6.8  -Son requests treatment with metformin  -Reiterated that the goal HbA1c is under 7.5, and patient at goal  -Medication not indicated at this time given that the HbA1c is at goal  -Educated on carb controlled diet  -Follow-up in 3 months

## 2025-04-04 DIAGNOSIS — I10 PRIMARY HYPERTENSION: Primary | ICD-10-CM

## 2025-04-04 DIAGNOSIS — E03.9 HYPOTHYROIDISM, UNSPECIFIED TYPE: Primary | ICD-10-CM

## 2025-04-04 RX ORDER — LOSARTAN POTASSIUM AND HYDROCHLOROTHIAZIDE 12.5; 1 MG/1; MG/1
1 TABLET ORAL DAILY
Qty: 90 TABLET | Refills: 1 | Status: SHIPPED | OUTPATIENT
Start: 2025-04-04

## 2025-04-04 RX ORDER — LEVOTHYROXINE SODIUM 75 UG/1
75 TABLET ORAL DAILY
Qty: 90 TABLET | Refills: 1 | Status: SHIPPED | OUTPATIENT
Start: 2025-04-04

## 2025-04-08 ENCOUNTER — HOSPITAL ENCOUNTER (OUTPATIENT)
Dept: BONE DENSITY | Facility: HOSPITAL | Age: 80
Discharge: HOME | End: 2025-04-08
Payer: MEDICARE

## 2025-04-08 VITALS — HEIGHT: 59 IN | WEIGHT: 149 LBS | BODY MASS INDEX: 30.04 KG/M2

## 2025-04-08 DIAGNOSIS — Z13.820 OSTEOPOROSIS SCREENING: ICD-10-CM

## 2025-04-08 DIAGNOSIS — N95.9 UNSPECIFIED MENOPAUSAL AND PERIMENOPAUSAL DISORDER: ICD-10-CM

## 2025-04-08 PROCEDURE — 77080 DXA BONE DENSITY AXIAL: CPT

## 2025-04-22 ENCOUNTER — TELEPHONE (OUTPATIENT)
Dept: FAMILY MEDICINE CLINIC | Facility: CLINIC | Age: 80
End: 2025-04-22

## 2025-04-22 DIAGNOSIS — M81.0 AGE-RELATED OSTEOPOROSIS WITHOUT CURRENT PATHOLOGICAL FRACTURE: Primary | ICD-10-CM

## 2025-04-22 RX ORDER — ALENDRONATE SODIUM 70 MG/1
70 TABLET ORAL
Qty: 15 TABLET | Refills: 1 | Status: SHIPPED | OUTPATIENT
Start: 2025-04-22

## 2025-05-16 ENCOUNTER — TELEPHONE (OUTPATIENT)
Dept: FAMILY MEDICINE CLINIC | Facility: CLINIC | Age: 80
End: 2025-05-16

## 2025-05-16 NOTE — TELEPHONE ENCOUNTER
Called patient to remind them they had an xray that still hasn't been completed. No answer, no voicemail.

## 2025-05-20 ENCOUNTER — OFFICE VISIT (OUTPATIENT)
Dept: FAMILY MEDICINE CLINIC | Facility: CLINIC | Age: 80
End: 2025-05-20
Payer: MEDICARE

## 2025-05-20 VITALS
BODY MASS INDEX: 30.24 KG/M2 | OXYGEN SATURATION: 94 % | DIASTOLIC BLOOD PRESSURE: 84 MMHG | TEMPERATURE: 97.2 F | SYSTOLIC BLOOD PRESSURE: 130 MMHG | HEIGHT: 59 IN | WEIGHT: 150 LBS | HEART RATE: 81 BPM

## 2025-05-20 DIAGNOSIS — E83.59 OTHER DISORDERS OF CALCIUM METABOLISM: ICD-10-CM

## 2025-05-20 DIAGNOSIS — Z13.21 ENCOUNTER FOR VITAMIN DEFICIENCY SCREENING: ICD-10-CM

## 2025-05-20 DIAGNOSIS — E11.9 TYPE 2 DIABETES MELLITUS WITHOUT COMPLICATION, WITHOUT LONG-TERM CURRENT USE OF INSULIN (HCC): Primary | ICD-10-CM

## 2025-05-20 LAB — SL AMB POCT HEMOGLOBIN AIC: 6.5 (ref ?–6.5)

## 2025-05-20 PROCEDURE — 99213 OFFICE O/P EST LOW 20 MIN: CPT | Performed by: FAMILY MEDICINE

## 2025-05-20 PROCEDURE — G2211 COMPLEX E/M VISIT ADD ON: HCPCS | Performed by: FAMILY MEDICINE

## 2025-05-20 PROCEDURE — 83036 HEMOGLOBIN GLYCOSYLATED A1C: CPT | Performed by: FAMILY MEDICINE

## 2025-05-20 NOTE — PROGRESS NOTES
Name: Radha Rios      : 1945      MRN: [id number]  Encounter Provider: Jeremy Thao MD  Encounter Date: 2025   Encounter department: [facility] PRIMARY CARE  :  Assessment & Plan  Type 2 diabetes mellitus without complication, without long-term current use of insulin ([facility])    Lab Results   Component Value Date    HGBA1C 6.5 2025   A1c 6.5, down from 6.8  Well-controlled off medications.  Will continue to monitor    Orders:    POCT hemoglobin A1c    Encounter for vitamin deficiency screening  Patient feeling fatigue and does have a history of osteoporosis  Will obtain vitamin D levels  Orders:    Vitamin D 25 hydroxy; Future    Other disorders of calcium metabolism    Orders:    Vitamin D 25 hydroxy; Future           History of Present Illness   Patient presenting to the clinic today for a recheck on diabetes and hip pain.  Patient was referred to physical therapy as well as a hip x-ray both of which patient does not have interest or time to do.  Patient states that her hip pain has been a lot better over the last 2 weeks that she has been using a belt and her pain only occurs when she bends down to  objects.  As of now, patient would like to hold off on further management and is able to manage her pain with this belt.    A1c in office today is 6.5, down from 6.8.  Diabetes is well-controlled at this time without the use of medications.    MA notes that Fosamax and Pepcid are both available at the pharmacy and patient can pick it up when she is able to.  Recent DEXA scan shows -3.1 of the lumbar spine but no concerning FRAX scores.  Will order a vitamin D level at this time for patient feeling of fatigue but advised 400 - 800 Vit D and 1200 calcium daily.        Review of Systems   Constitutional:  Positive for fatigue. Negative for chills and fever.   Respiratory:  Negative for shortness of breath and wheezing.    Cardiovascular:  Negative for chest pain.  "  Gastrointestinal:  Negative for blood in stool, nausea and vomiting.   Genitourinary:  Negative for hematuria.       Objective   /84 (BP Location: Left arm, Patient Position: Sitting)   Pulse 81   Temp (!) 97.2 °F (36.2 °C)   Ht 4' 10.5\" (1.486 m)   Wt 68 kg (150 lb)   SpO2 94%   BMI 30.82 kg/m²      Physical Exam  Vitals reviewed.   Constitutional:       General: She is not in acute distress.     Appearance: Normal appearance. She is well-developed.   HENT:      Head: Normocephalic.     Eyes:      General: No scleral icterus.     Extraocular Movements: Extraocular movements intact.      Conjunctiva/sclera: Conjunctivae normal.       Cardiovascular:      Rate and Rhythm: Normal rate and regular rhythm.      Pulses: Normal pulses.      Heart sounds: Normal heart sounds. No murmur heard.  Pulmonary:      Effort: Pulmonary effort is normal. No respiratory distress.      Breath sounds: Normal breath sounds.   Abdominal:      Palpations: Abdomen is soft.      Tenderness: There is no abdominal tenderness.     Musculoskeletal:      Right lower leg: No edema.      Left lower leg: No edema.     Skin:     General: Skin is warm.      Capillary Refill: Capillary refill takes less than 2 seconds.     Neurological:      Mental Status: She is alert.     Psychiatric:         Mood and Affect: Mood normal.         Behavior: Behavior normal.         "

## 2025-05-20 NOTE — ASSESSMENT & PLAN NOTE
Lab Results   Component Value Date    HGBA1C 6.5 05/20/2025   A1c 6.5, down from 6.8  Well-controlled off medications.  Will continue to monitor    Orders:    POCT hemoglobin A1c

## 2025-06-19 ENCOUNTER — TELEPHONE (OUTPATIENT)
Dept: FAMILY MEDICINE CLINIC | Facility: CLINIC | Age: 80
End: 2025-06-19

## 2025-06-19 NOTE — TELEPHONE ENCOUNTER
Called patient to let her know appointment for 6/20 is not necessary per provider. Appointment cancelled. No answer, voicemail full.

## 2025-06-20 ENCOUNTER — OFFICE VISIT (OUTPATIENT)
Dept: FAMILY MEDICINE CLINIC | Facility: CLINIC | Age: 80
End: 2025-06-20
Payer: MEDICARE

## 2025-06-20 VITALS
BODY MASS INDEX: 29.96 KG/M2 | OXYGEN SATURATION: 94 % | DIASTOLIC BLOOD PRESSURE: 66 MMHG | SYSTOLIC BLOOD PRESSURE: 128 MMHG | HEART RATE: 84 BPM | TEMPERATURE: 97.1 F | HEIGHT: 59 IN | WEIGHT: 148.6 LBS

## 2025-06-20 DIAGNOSIS — E11.22 TYPE 2 DIABETES MELLITUS WITH CHRONIC KIDNEY DISEASE, WITHOUT LONG-TERM CURRENT USE OF INSULIN, UNSPECIFIED CKD STAGE (HCC): ICD-10-CM

## 2025-06-20 DIAGNOSIS — K21.9 GASTROESOPHAGEAL REFLUX DISEASE WITHOUT ESOPHAGITIS: Primary | ICD-10-CM

## 2025-06-20 DIAGNOSIS — N95.2 VAGINAL ATROPHY: ICD-10-CM

## 2025-06-20 PROCEDURE — 99214 OFFICE O/P EST MOD 30 MIN: CPT | Performed by: FAMILY MEDICINE

## 2025-06-20 PROCEDURE — G2211 COMPLEX E/M VISIT ADD ON: HCPCS | Performed by: FAMILY MEDICINE

## 2025-06-20 RX ORDER — OMEPRAZOLE 40 MG/1
40 CAPSULE, DELAYED RELEASE ORAL DAILY
Qty: 60 CAPSULE | Refills: 0 | Status: SHIPPED | OUTPATIENT
Start: 2025-06-20

## 2025-06-20 NOTE — ASSESSMENT & PLAN NOTE
- Patient reporting that over the last 2 months she has been experiencing worsening reflux disease  -Patient reports history of hiatal hernia repair but is concerned of recurrence at this time and would like to follow-up with specialty  -PCP discussed with patient that would like her to trial omeprazole 40 mg as she reports famotidine has not been controlling symptoms and has placed referral as requested by patient  Orders:    Ambulatory Referral to Gastroenterology; Future    omeprazole (PriLOSEC) 40 MG capsule; Take 1 capsule (40 mg total) by mouth daily

## 2025-06-20 NOTE — PROGRESS NOTES
Name: Radha Rios      : 1945      MRN: [id number]  Encounter Provider: Damaris Valenzuela MD  Encounter Date: 2025   Encounter department: [facility] PRIMARY [facility]  :  Assessment & Plan  Gastroesophageal reflux disease without esophagitis  - Patient reporting that over the last 2 months she has been experiencing worsening reflux disease  -Patient reports history of hiatal hernia repair but is concerned of recurrence at this time and would like to follow-up with specialty  -PCP discussed with patient that would like her to trial omeprazole 40 mg as she reports famotidine has not been controlling symptoms and has placed referral as requested by patient  Orders:    Ambulatory Referral to Gastroenterology; Future    omeprazole (PriLOSEC) 40 MG capsule; Take 1 capsule (40 mg total) by mouth daily    Type 2 diabetes mellitus with chronic kidney disease, without long-term current use of insulin, unspecified CKD stage ([facility])    Lab Results   Component Value Date    HGBA1C 6.5 2025            Vaginal atrophy  - Patient describing that over the last 3 to 4 months has been experiencing intermittent vaginal itching typically at night  -Patient denies any discharge, bleeding or use of vaginal products  -Given presentation, PCP likely that patient suffering from vaginal atrophy and has discussed with patient that would like her to trial Vagisil x 1 week to assess symptom progression              History of Present Illness   Heartburn  She complains of heartburn. This is a recurrent problem. The current episode started more than 1 month ago. The problem has been waxing and waning. The heartburn duration is several minutes. The heartburn is located in the substernum. The heartburn is of moderate intensity. The symptoms are aggravated by certain foods. She has tried an antacid and a PPI for the symptoms. The treatment provided mild relief.     Review of Systems   Gastrointestinal:  Positive for  "heartburn.       Objective   /66 (BP Location: Right arm, Patient Position: Sitting, Cuff Size: Adult)   Pulse 84   Temp (!) 97.1 °F (36.2 °C) (Tympanic)   Ht 4' 10.5\" (1.486 m)   Wt 67.4 kg (148 lb 9.6 oz)   SpO2 94%   BMI 30.53 kg/m²      Physical Exam  Constitutional:       Appearance: She is well-developed.   HENT:      Head: Normocephalic and atraumatic.     Eyes:      Conjunctiva/sclera: Conjunctivae normal.       Cardiovascular:      Rate and Rhythm: Normal rate and regular rhythm.      Heart sounds: Normal heart sounds.   Abdominal:      General: Bowel sounds are normal.      Palpations: Abdomen is soft.     Skin:     General: Skin is warm and dry.     Neurological:      Mental Status: She is alert and oriented to person, place, and time.         "